# Patient Record
Sex: MALE | Race: WHITE | Employment: UNEMPLOYED | URBAN - METROPOLITAN AREA
[De-identification: names, ages, dates, MRNs, and addresses within clinical notes are randomized per-mention and may not be internally consistent; named-entity substitution may affect disease eponyms.]

---

## 2021-01-06 ENCOUNTER — OCC HEALTH (OUTPATIENT)
Dept: OCCUPATIONAL MEDICINE | Age: 21
End: 2021-01-06
Attending: FAMILY MEDICINE

## 2021-01-06 ENCOUNTER — HOSPITAL ENCOUNTER (OUTPATIENT)
Dept: GENERAL RADIOLOGY | Age: 21
Discharge: HOME | End: 2021-01-06
Attending: FAMILY MEDICINE

## 2021-01-06 DIAGNOSIS — S89.92XA LEFT KNEE INJURY, INITIAL ENCOUNTER: Primary | ICD-10-CM

## 2021-01-06 DIAGNOSIS — S89.92XA LEFT KNEE INJURY, INITIAL ENCOUNTER: ICD-10-CM

## 2021-01-06 PROCEDURE — 73562 X-RAY EXAM OF KNEE 3: CPT | Performed by: FAMILY MEDICINE

## 2021-01-13 ENCOUNTER — OFFICE VISIT (OUTPATIENT)
Dept: OCCUPATIONAL MEDICINE | Age: 21
End: 2021-01-13
Attending: FAMILY MEDICINE

## 2021-01-13 DIAGNOSIS — S89.92XD LEFT KNEE INJURY, SUBSEQUENT ENCOUNTER: Primary | ICD-10-CM

## 2021-01-13 RX ORDER — NAPROXEN 500 MG/1
500 TABLET ORAL 2 TIMES DAILY
Qty: 60 TABLET | Refills: 1 | Status: SHIPPED | OUTPATIENT
Start: 2021-01-13 | End: 2021-02-12

## 2021-02-01 ENCOUNTER — OFFICE VISIT (OUTPATIENT)
Dept: OCCUPATIONAL MEDICINE | Age: 21
End: 2021-02-01
Attending: PHYSICIAN ASSISTANT

## 2021-02-01 DIAGNOSIS — S83.105A ACUTE TRAUMATIC INTERNAL DERANGEMENT OF KNEE, LEFT: Primary | ICD-10-CM

## 2021-02-09 ENCOUNTER — TELEPHONE (OUTPATIENT)
Dept: ORTHOPEDICS CLINIC | Facility: CLINIC | Age: 21
End: 2021-02-09

## 2021-02-09 NOTE — TELEPHONE ENCOUNTER
Patient has an appointment scheduled with Dr. Kade Luna on 2/11 for left knee pain. Will patient need additional imaging? He stated he had imaging done at THE [facility].

## 2021-02-11 ENCOUNTER — OFFICE VISIT (OUTPATIENT)
Dept: ORTHOPEDICS CLINIC | Facility: CLINIC | Age: 21
End: 2021-02-11
Payer: OTHER MISCELLANEOUS

## 2021-02-11 VITALS — OXYGEN SATURATION: 98 % | HEART RATE: 94 BPM

## 2021-02-11 DIAGNOSIS — S83.282A DISLOCATION OF LEFT KNEE WITH LATERAL MENISCUS TEAR, INITIAL ENCOUNTER: Primary | ICD-10-CM

## 2021-02-11 DIAGNOSIS — S83.105A DISLOCATION OF LEFT KNEE WITH LATERAL MENISCUS TEAR, INITIAL ENCOUNTER: Primary | ICD-10-CM

## 2021-02-11 PROCEDURE — 99204 OFFICE O/P NEW MOD 45 MIN: CPT | Performed by: ORTHOPAEDIC SURGERY

## 2021-02-13 PROBLEM — S83.282A DISLOCATION OF LEFT KNEE WITH LATERAL MENISCUS TEAR: Status: ACTIVE | Noted: 2021-02-13

## 2021-02-13 PROBLEM — S83.105A DISLOCATION OF LEFT KNEE WITH LATERAL MENISCUS TEAR: Status: ACTIVE | Noted: 2021-02-13

## 2021-02-13 NOTE — H&P
[facility] - ORTHOPEDICS  [facility] [id number]  [phone]     NEW PATIENT VISIT - HISTORY AND PHYSICAL EXAMINATION     Name: Darlin Roldan   MRN: [id number]  Date: 2/11/2021     CC: Left Knee Sanford Meza Allergic/Immunologic: Negative for environmental allergies. Neurological: Negative for dizziness, numbness and headaches. Hematological: Does not bruise/bleed easily. Psychiatric/Behavioral: Negative for confusion and sleep disturbance.         SOCIAL Distal neurovascular exam demonstrates normal perfusion, intact sensation to light touch and full strength. Examination of the contralateral knee demonstrates:  No significant atrophy, swelling or effusion. Full range of motion.  Neurovascularly intact t: [phone]  o: [phone]  f: [phone]        This note was dictated using Dragon software. While it was briefly proofread prior to completion, some grammatical, spelling, and word choice errors due to dictation may still occur.

## 2021-02-17 ENCOUNTER — OFFICE VISIT (OUTPATIENT)
Dept: ORTHOPEDICS CLINIC | Facility: CLINIC | Age: 21
End: 2021-02-17
Payer: OTHER MISCELLANEOUS

## 2021-02-17 VITALS — RESPIRATION RATE: 16 BRPM | OXYGEN SATURATION: 99 % | HEIGHT: 71 IN | HEART RATE: 94 BPM

## 2021-02-17 DIAGNOSIS — M23.200 OLD COMPLEX TEAR OF LATERAL MENISCUS OF RIGHT KNEE: Primary | ICD-10-CM

## 2021-02-17 DIAGNOSIS — S83.282A OTHER TEAR OF LATERAL MENISCUS OF LEFT KNEE AS CURRENT INJURY, INITIAL ENCOUNTER: ICD-10-CM

## 2021-02-17 PROCEDURE — 99213 OFFICE O/P EST LOW 20 MIN: CPT | Performed by: ORTHOPAEDIC SURGERY

## 2021-02-20 PROBLEM — M23.200 OLD COMPLEX TEAR OF LATERAL MENISCUS OF RIGHT KNEE: Status: ACTIVE | Noted: 2021-02-20

## 2021-02-20 PROBLEM — S83.282A TEAR OF LATERAL MENISCUS OF LEFT KNEE, CURRENT: Status: ACTIVE | Noted: 2021-02-20

## 2021-02-20 PROBLEM — S83.105A DISLOCATION OF LEFT KNEE WITH LATERAL MENISCUS TEAR: Status: RESOLVED | Noted: 2021-02-13 | Resolved: 2021-02-20

## 2021-02-20 PROBLEM — S83.282A DISLOCATION OF LEFT KNEE WITH LATERAL MENISCUS TEAR: Status: RESOLVED | Noted: 2021-02-13 | Resolved: 2021-02-20

## 2021-02-20 NOTE — PROCEDURES
Left Knee Intra-articular Injection    Name: Yasir Verdin   MRN: [id number]  Date: 2/17/2021     Clinical Indications:   Lateral Meniscus tear    After informed consent, the injection site was marked, sterilized with topical chlorhexidine antiseptic, and

## 2021-02-21 NOTE — PROGRESS NOTES
[facility]  [address] [facility] [address] [facility] [address]     Name: Shweta Gutierrez   MRN: [id number]  Date: 2/17/2021     REASON FOR VISIT: Evaluation of left knee lateral meniscus tear and platel effusion. Full range of motion. Neurovascularly intact distally. Radiographic Examination/Diagnostics: MRI scan of left knee personally viewed, independently interpreted and radiology report was reviewed.     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

## 2021-03-31 ENCOUNTER — OFFICE VISIT (OUTPATIENT)
Dept: ORTHOPEDICS CLINIC | Facility: CLINIC | Age: 21
End: 2021-03-31
Payer: OTHER MISCELLANEOUS

## 2021-03-31 VITALS — HEART RATE: 105 BPM | OXYGEN SATURATION: 100 %

## 2021-03-31 DIAGNOSIS — M23.200 OLD COMPLEX TEAR OF LATERAL MENISCUS OF RIGHT KNEE: Primary | ICD-10-CM

## 2021-03-31 PROCEDURE — 99213 OFFICE O/P EST LOW 20 MIN: CPT | Performed by: ORTHOPAEDIC SURGERY

## 2021-03-31 NOTE — PROGRESS NOTES
[facility]  [address] [place] [phone]     Name: Darlin Roldan   MRN: [id number]  Date: 3/31/2021    REASON FOR VISIT: Evaluation of left knee lateral meniscus tear after plate touch and full strength. Examination of the contralateral knee demonstrates:  No significant atrophy, swelling or effusion. Full range of motion. Neurovascularly intact distally.       Radiographic Examination/Diagnostics: No new imaging obtained at [facility]

## 2021-04-01 ENCOUNTER — MED REC SCAN ONLY (OUTPATIENT)
Dept: ORTHOPEDICS CLINIC | Facility: CLINIC | Age: 21
End: 2021-04-01

## 2021-04-07 ENCOUNTER — TELEPHONE (OUTPATIENT)
Dept: ORTHOPEDICS CLINIC | Facility: CLINIC | Age: 21
End: 2021-04-07

## 2021-04-07 NOTE — TELEPHONE ENCOUNTER
FAUSTINO Hickman Progress Note dated 03/23 received from clinical staff; signed by provider, fax date 03/23; sent to scanning

## 2021-04-20 ENCOUNTER — TELEPHONE (OUTPATIENT)
Dept: ORTHOPEDICS CLINIC | Facility: CLINIC | Age: 21
End: 2021-04-20

## 2021-04-20 NOTE — TELEPHONE ENCOUNTER
Workqueue Account Note    DOS 2/17/21 TX 2    CALLED [employer] @[phone] PER VENKAT UNABLE TO LOCATE A WC CLAIM WITH WC CLAIM# PROVIDED NOR PT'S SS#, NOR NAME. Please update WC claim info and send back to supervisor. Thanks!

## 2021-04-29 ENCOUNTER — HOSPITAL ENCOUNTER (EMERGENCY)
Age: 21
Discharge: HOME | End: 2021-04-29
Attending: EMERGENCY MEDICINE
Payer: COMMERCIAL

## 2021-04-29 ENCOUNTER — APPOINTMENT (OUTPATIENT)
Dept: GENERAL RADIOLOGY | Age: 21
End: 2021-04-29
Attending: EMERGENCY MEDICINE
Payer: COMMERCIAL

## 2021-04-29 VITALS
BODY MASS INDEX: 28 KG/M2 | OXYGEN SATURATION: 98 % | DIASTOLIC BLOOD PRESSURE: 69 MMHG | HEART RATE: 104 BPM | WEIGHT: 204 LBS | RESPIRATION RATE: 18 BRPM | SYSTOLIC BLOOD PRESSURE: 134 MMHG

## 2021-04-29 DIAGNOSIS — R07.9 CHEST PAIN OF UNCERTAIN ETIOLOGY: Primary | ICD-10-CM

## 2021-04-29 DIAGNOSIS — K21.9 GASTROESOPHAGEAL REFLUX DISEASE, UNSPECIFIED WHETHER ESOPHAGITIS PRESENT: ICD-10-CM

## 2021-04-29 DIAGNOSIS — E87.6 HYPOKALEMIA: ICD-10-CM

## 2021-04-29 PROCEDURE — 93010 ELECTROCARDIOGRAM REPORT: CPT

## 2021-04-29 PROCEDURE — 85025 COMPLETE CBC W/AUTO DIFF WBC: CPT | Performed by: EMERGENCY MEDICINE

## 2021-04-29 PROCEDURE — 99285 EMERGENCY DEPT VISIT HI MDM: CPT

## 2021-04-29 PROCEDURE — 93005 ELECTROCARDIOGRAM TRACING: CPT

## 2021-04-29 PROCEDURE — 71045 X-RAY EXAM CHEST 1 VIEW: CPT | Performed by: EMERGENCY MEDICINE

## 2021-04-29 PROCEDURE — 84484 ASSAY OF TROPONIN QUANT: CPT | Performed by: EMERGENCY MEDICINE

## 2021-04-29 PROCEDURE — 80053 COMPREHEN METABOLIC PANEL: CPT | Performed by: EMERGENCY MEDICINE

## 2021-04-29 PROCEDURE — 96374 THER/PROPH/DIAG INJ IV PUSH: CPT

## 2021-04-29 PROCEDURE — 85379 FIBRIN DEGRADATION QUANT: CPT | Performed by: EMERGENCY MEDICINE

## 2021-04-29 RX ORDER — POTASSIUM CHLORIDE 20 MEQ/1
20 TABLET, EXTENDED RELEASE ORAL ONCE
Status: COMPLETED | OUTPATIENT
Start: 2021-04-29 | End: 2021-04-29

## 2021-04-29 RX ORDER — ONDANSETRON 4 MG/1
4 TABLET, ORALLY DISINTEGRATING ORAL EVERY 4 HOURS PRN
Qty: 10 TABLET | Refills: 0 | Status: SHIPPED | OUTPATIENT
Start: 2021-04-29 | End: 2021-05-06

## 2021-04-29 RX ORDER — OMEPRAZOLE 20 MG/1
20 CAPSULE, DELAYED RELEASE ORAL
COMMUNITY

## 2021-04-29 RX ORDER — ONDANSETRON 2 MG/ML
4 INJECTION INTRAMUSCULAR; INTRAVENOUS ONCE
Status: COMPLETED | OUTPATIENT
Start: 2021-04-29 | End: 2021-04-29

## 2021-04-29 RX ORDER — PANTOPRAZOLE SODIUM 40 MG/1
40 TABLET, DELAYED RELEASE ORAL DAILY
Qty: 30 TABLET | Refills: 0 | Status: SHIPPED | OUTPATIENT
Start: 2021-04-29 | End: 2021-05-29

## 2021-04-29 RX ORDER — ASPIRIN 81 MG/1
324 TABLET, CHEWABLE ORAL ONCE
Status: COMPLETED | OUTPATIENT
Start: 2021-04-29 | End: 2021-04-29

## 2021-04-30 NOTE — ED PROVIDER NOTES
Patient Seen in: THE [facility] [place]      History   Patient presents with:  Chest Pain Angina    Stated Complaint: chest pain x2 days     HPI/Subjective:   HPI    This is a 18-year-old male who arrives here with complaints of left-s patient is in no respiratory distress. The patient is not septic or toxic    HEENT: Atraumatic, conjunctiva are not pale. There is no icterus. Oral mucosa Is wet. No facial trauma. The neck is supple.     LUNGS: Clear to auscultation, there is no wheezi There is no acute ST elevations or ischemic findings. The rest of the EKG including rate rhythm axis and intervals I agree with the EKG report .  The rate is 109      XR CHEST AP PORTABLE  (CPT=71045)    Result Date: 4/29/2021  PROCEDURE:  XR CHEST AP PORT that if he has any increasing pain or discomfort to return here. I recommend close follow-up and outpatient work-up including outpatient stress test, if he continues have symptoms.     If he has any severe pain to return to the emergency room I discussed

## 2021-05-10 ENCOUNTER — OFFICE VISIT (OUTPATIENT)
Dept: ORTHOPEDICS CLINIC | Facility: CLINIC | Age: 21
End: 2021-05-10
Payer: OTHER MISCELLANEOUS

## 2021-05-10 VITALS — OXYGEN SATURATION: 95 % | HEART RATE: 115 BPM

## 2021-05-10 DIAGNOSIS — M23.200 OLD COMPLEX TEAR OF LATERAL MENISCUS OF RIGHT KNEE: Primary | ICD-10-CM

## 2021-05-10 PROCEDURE — 99213 OFFICE O/P EST LOW 20 MIN: CPT | Performed by: ORTHOPAEDIC SURGERY

## 2021-05-11 NOTE — PROGRESS NOTES
[facility]  [address] [place] [address]     Name: Noah Hodges   MRN: [id number]  Date: 5/10/2021    REASON FOR VISIT: Evaluation of left knee lateral meniscus tear after plate Examination of the contralateral knee demonstrates:  No significant atrophy, swelling or effusion. Full range of motion. Neurovascularly intact distally. Radiographic Examination/Diagnostics: No new imaging obtained at this visit.     IMPRESSION: T